# Patient Record
Sex: FEMALE | Race: BLACK OR AFRICAN AMERICAN | URBAN - METROPOLITAN AREA
[De-identification: names, ages, dates, MRNs, and addresses within clinical notes are randomized per-mention and may not be internally consistent; named-entity substitution may affect disease eponyms.]

---

## 2022-01-04 ENCOUNTER — OFFICE VISIT (OUTPATIENT)
Dept: URBAN - METROPOLITAN AREA CLINIC 17 | Facility: CLINIC | Age: 67
End: 2022-01-04

## 2022-01-27 ENCOUNTER — WEB ENCOUNTER (OUTPATIENT)
Dept: URBAN - METROPOLITAN AREA CLINIC 17 | Facility: CLINIC | Age: 67
End: 2022-01-27

## 2022-01-27 ENCOUNTER — OFFICE VISIT (OUTPATIENT)
Dept: URBAN - METROPOLITAN AREA CLINIC 17 | Facility: CLINIC | Age: 67
End: 2022-01-27
Payer: COMMERCIAL

## 2022-01-27 ENCOUNTER — LAB OUTSIDE AN ENCOUNTER (OUTPATIENT)
Dept: URBAN - METROPOLITAN AREA CLINIC 17 | Facility: CLINIC | Age: 67
End: 2022-01-27

## 2022-01-27 DIAGNOSIS — E13.9 DIABETES 1.5, MANAGED AS TYPE 2: ICD-10-CM

## 2022-01-27 DIAGNOSIS — Z12.11 SCREEN FOR COLON CANCER: ICD-10-CM

## 2022-01-27 DIAGNOSIS — K59.09 CHRONIC CONSTIPATION WITH OVERFLOW: ICD-10-CM

## 2022-01-27 DIAGNOSIS — R19.4 CHANGE IN BOWEL HABITS: ICD-10-CM

## 2022-01-27 PROCEDURE — 99203 OFFICE O/P NEW LOW 30 MIN: CPT | Performed by: INTERNAL MEDICINE

## 2022-01-27 RX ORDER — ATORVASTATIN CALCIUM, FILM COATED 20 MG/1
TABLET ORAL
Qty: 90 | Refills: 1 | Status: ACTIVE | COMMUNITY

## 2022-01-27 RX ORDER — METFORMIN HCL 500 MG/1
TABLET ORAL
Qty: 90 | Refills: 0 | Status: ACTIVE | COMMUNITY

## 2022-01-27 NOTE — HPI-TODAY'S VISIT:
1/27/22 65 yo lady pt of Dr Noni Agudelo She had a colonoscopy 6-7 years ago. 2 polyps removed. Told to repeat in 5 years She has had irregular BMs for years - usually 2 BMs per week Over the past 2 months this is now interspersed with loose stools. No blood in stool Straining with hard stools. Denies rectal bleeding.  No fhx of colon Ca.

## 2022-04-08 PROBLEM — 426875007: Status: ACTIVE | Noted: 2022-01-27

## 2022-04-20 ENCOUNTER — OFFICE VISIT (OUTPATIENT)
Dept: URBAN - METROPOLITAN AREA SURGERY CENTER 16 | Facility: SURGERY CENTER | Age: 67
End: 2022-04-20
Payer: COMMERCIAL

## 2022-04-20 DIAGNOSIS — Z86.010 ADENOMAS PERSONAL HISTORY OF COLONIC POLYPS: ICD-10-CM

## 2022-04-20 PROCEDURE — G0105 COLORECTAL SCRN; HI RISK IND: HCPCS | Performed by: INTERNAL MEDICINE

## 2022-04-20 PROCEDURE — G8907 PT DOC NO EVENTS ON DISCHARG: HCPCS | Performed by: INTERNAL MEDICINE

## 2022-04-20 RX ORDER — METFORMIN HCL 500 MG/1
TABLET ORAL
Qty: 90 | Refills: 0 | Status: ACTIVE | COMMUNITY

## 2022-04-20 RX ORDER — ATORVASTATIN CALCIUM, FILM COATED 20 MG/1
TABLET ORAL
Qty: 90 | Refills: 1 | Status: ACTIVE | COMMUNITY

## 2023-07-18 ENCOUNTER — OFFICE VISIT (OUTPATIENT)
Dept: URBAN - METROPOLITAN AREA CLINIC 17 | Facility: CLINIC | Age: 68
End: 2023-07-18
Payer: COMMERCIAL

## 2023-07-18 VITALS
HEIGHT: 63 IN | BODY MASS INDEX: 29.23 KG/M2 | SYSTOLIC BLOOD PRESSURE: 159 MMHG | HEART RATE: 87 BPM | DIASTOLIC BLOOD PRESSURE: 89 MMHG | TEMPERATURE: 97.1 F | WEIGHT: 165 LBS

## 2023-07-18 DIAGNOSIS — K59.01 CONSTIPATION BY DELAYED COLONIC TRANSIT: ICD-10-CM

## 2023-07-18 PROBLEM — 35298007: Status: ACTIVE | Noted: 2023-07-18

## 2023-07-18 PROCEDURE — 99213 OFFICE O/P EST LOW 20 MIN: CPT | Performed by: INTERNAL MEDICINE

## 2023-07-18 RX ORDER — METFORMIN HCL 500 MG/1
TABLET ORAL
Qty: 90 | Refills: 0 | Status: ACTIVE | COMMUNITY

## 2023-07-18 RX ORDER — ATORVASTATIN CALCIUM, FILM COATED 20 MG/1
TABLET ORAL
Qty: 90 | Refills: 1 | Status: ACTIVE | COMMUNITY

## 2023-07-18 RX ORDER — LINACLOTIDE 145 UG/1
1 CAPSULE AT LEAST 30 MINUTES BEFORE THE FIRST MEAL OF THE DAY ON AN EMPTY STOMACH CAPSULE, GELATIN COATED ORAL ONCE A DAY
Qty: 30 | Refills: 11 | OUTPATIENT
Start: 2023-07-18 | End: 2024-07-12

## 2023-07-18 NOTE — HPI-TODAY'S VISIT:
This is a 69 yo female here for constipation.  She was last seen by Dr Conte in 2022.  At that time a colonoscopy demonstrated hemorrhoids  and a fair prep but was otherwise unremarkable.  Repeat colonoscopy was recommended in 5 years.  The patient reports LLQ that radiates to her back.  Stool evacuation occurs once a week.  She denies bloating.  She does not have the sensation to have a bowel movement.  She is from [place], [place] and consumes a lot of greens.  Miralax is minimally effective.  Metamucil makes the stool too bulky.  Senna is effective but she prefers not to take it long term.  Prune juice resulted in diarrhea.  Because she is a diabetic she prefers not to take it.

## 2023-07-18 NOTE — PHYSICAL EXAM RECTAL:
normal tone, no external hemorrhoids, no masses palpable, no red blood, Tenderness on SOL, Internal hemorrhoids present

## 2023-07-18 NOTE — HPI-OTHER HISTORIES
(Dr Conte:1/27/22)  67 yo lady pt of Dr Noni Agudelo She had a colonoscopy 6-7 years ago. 2 polyps removed. Told to repeat in 5 years She has had irregular BMs for years - usually 2 BMs per week Over the past 2 months this is now interspersed with loose stools. No blood in stool Straining with hard stools. Denies rectal bleeding.  No fhx of colon Ca.

## 2023-09-21 ENCOUNTER — DASHBOARD ENCOUNTERS (OUTPATIENT)
Age: 68
End: 2023-09-21

## 2023-09-26 ENCOUNTER — OFFICE VISIT (OUTPATIENT)
Dept: URBAN - METROPOLITAN AREA CLINIC 17 | Facility: CLINIC | Age: 68
End: 2023-09-26
Payer: COMMERCIAL

## 2023-09-26 VITALS
SYSTOLIC BLOOD PRESSURE: 139 MMHG | BODY MASS INDEX: 29.41 KG/M2 | HEART RATE: 76 BPM | WEIGHT: 166 LBS | DIASTOLIC BLOOD PRESSURE: 92 MMHG | TEMPERATURE: 97.2 F | HEIGHT: 63 IN

## 2023-09-26 DIAGNOSIS — K59.01 CONSTIPATION BY DELAYED COLONIC TRANSIT: ICD-10-CM

## 2023-09-26 DIAGNOSIS — Z12.11 COLON CANCER SCREENING: ICD-10-CM

## 2023-09-26 PROCEDURE — 99212 OFFICE O/P EST SF 10 MIN: CPT | Performed by: INTERNAL MEDICINE

## 2023-09-26 RX ORDER — LINACLOTIDE 145 UG/1
1 CAPSULE AT LEAST 30 MINUTES BEFORE THE FIRST MEAL OF THE DAY ON AN EMPTY STOMACH CAPSULE, GELATIN COATED ORAL ONCE A DAY
Qty: 30 | Refills: 11 | Status: ACTIVE | COMMUNITY
Start: 2023-07-18 | End: 2024-07-12

## 2023-09-26 RX ORDER — ATORVASTATIN CALCIUM, FILM COATED 20 MG/1
TABLET ORAL
Qty: 90 | Refills: 1 | Status: ACTIVE | COMMUNITY

## 2023-09-26 RX ORDER — METFORMIN HCL 500 MG/1
TABLET ORAL
Qty: 90 | Refills: 0 | Status: ACTIVE | COMMUNITY

## 2023-09-26 NOTE — HPI-OTHER HISTORIES
(July 2023) This is a 69 yo female here for constipation.  She was last seen by Dr Conte in 2022.  At that time a colonoscopy demonstrated hemorrhoids  and a fair prep but was otherwise unremarkable.  Repeat colonoscopy was recommended in 5 years.  The patient reports LLQ that radiates to her back.  Stool evacuation occurs once a week.  She denies bloating.  She does not have the sensation to have a bowel movement.  She is from [place], [place] and consumes a lot of greens.  Miralax is minimally effective.  Metamucil makes the stool too bulky.  Senna is effective but she prefers not to take it long term.  Prune juice resulted in diarrhea.  Because she is a diabetic she prefers not to take it.  (Dr Conte:1/27/22)  65 yo lady pt of Dr Noni Agudelo She had a colonoscopy 6-7 years ago. 2 polyps removed. Told to repeat in 5 years She has had irregular BMs for years - usually 2 BMs per week Over the past 2 months this is now interspersed with loose stools. No blood in stool Straining with hard stools. Denies rectal bleeding.  No fhx of colon Ca.

## 2023-09-26 NOTE — HPI-TODAY'S VISIT:
This is a 69 yo female here for follow up of constipation.  Linzess 145 mcg once a day was not effective.  She is originally from [place], [place].  Prune juice is effective but due to diabetes she does not take it.  She does well with Cerasee herbal tea taken daily.  This results in once daily bowel movements with the sensation of complete emptying.   Colonoscopy performed by Dr. Conte 2022 was unremarkable.